# Patient Record
Sex: MALE | Race: WHITE | Employment: UNEMPLOYED | URBAN - METROPOLITAN AREA
[De-identification: names, ages, dates, MRNs, and addresses within clinical notes are randomized per-mention and may not be internally consistent; named-entity substitution may affect disease eponyms.]

---

## 2023-01-06 RX ORDER — MULTIVIT WITH MINERALS/LUTEIN
1000 TABLET ORAL DAILY
COMMUNITY

## 2023-01-06 RX ORDER — VALACYCLOVIR HYDROCHLORIDE 1 G/1
1000 TABLET, FILM COATED ORAL 2 TIMES DAILY
COMMUNITY

## 2023-01-06 RX ORDER — DORZOLAMIDE HYDROCHLORIDE AND TIMOLOL MALEATE 20; 5 MG/ML; MG/ML
1 SOLUTION/ DROPS OPHTHALMIC 2 TIMES DAILY
COMMUNITY

## 2023-01-06 RX ORDER — PREDNISOLONE ACETATE 10 MG/ML
1 SUSPENSION/ DROPS OPHTHALMIC 4 TIMES DAILY
COMMUNITY

## 2023-01-06 RX ORDER — M-VIT,TX,IRON,MINS/CALC/FOLIC 27MG-0.4MG
1 TABLET ORAL DAILY
COMMUNITY

## 2023-01-06 RX ORDER — BRIMONIDINE TARTRATE 2 MG/ML
1 SOLUTION/ DROPS OPHTHALMIC 3 TIMES DAILY
COMMUNITY

## 2023-01-06 NOTE — PROGRESS NOTES
[facility] time_____0940_______        Surgery time___1110_________    Take the following medications with a sip of water: Follow your MD/Surgeons pre-procedure instructions regarding your medications     Do not eat or drink anything after 12:00 midnight prior to your surgery. This includes water chewing gum, mints and ice chips. You may brush your teeth and gargle the morning of your surgery, but do not swallow the water     Please see your family doctor/pediatrician for a history and physical and/or concerning medications. H&P 1/9/23 Melissa Amaro    Bring any test results/reports from your physicians office. If you are under the care of a heart doctor or specialist doctor, please be aware that you may be asked to them for clearance    You may be asked to stop blood thinners such as Coumadin, Plavix, Fragmin, Lovenox, etc., or any anti-inflammatories such as:  Aspirin, Ibuprofen, Advil, Naproxen prior to your surgery. We also ask that you stop any OTC medications such as fish oil, vitamin E, glucosamine, garlic, Multivitamins, COQ 10, etc.    We ask that you do not smoke 24 hours prior to surgery  We ask that you do not  drink any alcoholic beverages 24 hours prior to surgery     You must make arrangements for a responsible adult to take you home after your surgery. For your safety you will not be allowed to leave alone or drive yourself home. Your surgery will be cancelled if you do not have a ride home. Also for your safety, it is strongly suggested that someone stay with you the first 24 hours after your surgery. A parent or legal guardian must accompany a child scheduled for surgery and plan to stay at the hospital until the child is discharged. Please do not bring other children with you. For your comfort, please wear simple loose fitting clothing to the hospital.  Please do not bring valuables.  Wear short sleeve button down shirt or loose shirt and bring eye drops or eye ointment. Do not wear any make-up or nail polish on your fingers or toes      For your safety, please do not wear any jewelry or body piercing's on the day of surgery. All jewelry must be removed. If you have dentures, they will be removed before going to operating room. For your convenience, we will provide you with a container. If you wear contact lenses or glasses, they will be removed, please bring a case for them. If you have a living will and a durable power of  for healthcare, please bring in a copy. As part of our patient safety program to minimize surgical site infections, we ask you to do the following:    Please notify your surgeon if you develop any illness between         now and the  day of your surgery. This includes a cough, cold, fever, sore throat, nausea,         or vomiting, and diarrhea, etc.   Please notify your surgeon if you experience dizziness, shortness         of breath or blurred vision between now and the time of your surgery. Do not shave your operative site 96 hours prior to surgery. For face and neck surgery, men may use an electric razor 48 hours   prior to surgery. You may shower the night before surgery or the morning of   your surgery with an antibacterial soap. You will need to bring a photo ID and insurance card    [facility] has an onsite pharmacy, would you like to utilize our pharmacy     If you will be staying overnight and use a C-pap machine, please bring   your C-pap to hospital     Our goal is to provide you with excellent care, therefore, visitors will be limited to two(2) in the room at a time so that we may focus on providing this care for you. Please contact pre-admission testing if you have any further questions.                  [facility] phone number:  [phone]  Please note these are generalized instructions for all surgical cases, you may be provided with more specific instructions according to your surgery. C-Difficile admission screening and protocol:       * Admitted with diarrhea? [] YES    [x]  NO     *Prior history of C-Diff. In last 3 months? [] YES    [x]  NO     *Antibiotic use in the past 6-8 weeks? []  NO    []  YES                 If yes, which ANTIBIOTIC AND REASON______     *Prior hospitalization or nursing home in the last month? []  YES    []  NO        SAFETY FIRST. .call before you fall

## 2023-01-09 ENCOUNTER — ANESTHESIA EVENT (OUTPATIENT)
Dept: SURGERY | Age: 48
End: 2023-01-09
Payer: COMMERCIAL

## 2023-01-09 NOTE — PROGRESS NOTES
[address] [facility]  [phone]        Pre-Op Phone Call:     Patient Name: Mariana Sanchez     Telephone Information:   Mobile [phone]     Home phone:  [phone]    Surgery Time:   11:10 AM     Arrival Time:  9:40am     Left extended Message:  Yes     Message left with:     Recent change in health status:  NA     Advised of transportation/ policy:  NA     NPO policy reviewed:  NA     Advised to take morning heart/blood pressure medications with sips of water morning of surgery? NA     Instructed to bring eye drops, photo identification, and insurance card day of surgery? NA     Advised to wear short sleeved button down shirt (no T-shirt underneath):  NA     Advised not to wear jewelry, hairpins, or pantyhose day of surgery? NA     Advised not to wear make-up and to wash face day of surgery? NA    Remarks:  Attempted to call and leave VM, however, pt's voicemail box is full.         Electronically signed by:  Anna Lopez RN at 1/9/2023 11:27 AM

## 2023-01-10 ENCOUNTER — PREP FOR PROCEDURE (OUTPATIENT)
Dept: OPHTHALMOLOGY | Age: 48
End: 2023-01-10

## 2023-01-10 ENCOUNTER — ANESTHESIA (OUTPATIENT)
Dept: SURGERY | Age: 48
End: 2023-01-10
Payer: COMMERCIAL

## 2023-01-10 ENCOUNTER — HOSPITAL ENCOUNTER (OUTPATIENT)
Age: 48
Setting detail: OUTPATIENT SURGERY
Discharge: HOME | End: 2023-01-10
Attending: OPHTHALMOLOGY | Admitting: OPHTHALMOLOGY
Payer: COMMERCIAL

## 2023-01-10 VITALS
TEMPERATURE: 97.7 F | SYSTOLIC BLOOD PRESSURE: 120 MMHG | HEIGHT: 70 IN | OXYGEN SATURATION: 99 % | WEIGHT: 180 LBS | BODY MASS INDEX: 25.77 KG/M2 | RESPIRATION RATE: 13 BRPM | HEART RATE: 50 BPM | DIASTOLIC BLOOD PRESSURE: 85 MMHG

## 2023-01-10 PROCEDURE — 3600000003 HC SURGERY LEVEL 3 BASE: Performed by: OPHTHALMOLOGY

## 2023-01-10 PROCEDURE — 3700000001 HC ADD 15 MINUTES (ANESTHESIA): Performed by: OPHTHALMOLOGY

## 2023-01-10 PROCEDURE — 3700000000 HC ANESTHESIA ATTENDED CARE: Performed by: OPHTHALMOLOGY

## 2023-01-10 PROCEDURE — 6370000000 HC RX 637 (ALT 250 FOR IP): Performed by: OPHTHALMOLOGY

## 2023-01-10 PROCEDURE — 6360000002 HC RX W HCPCS: Performed by: NURSE ANESTHETIST, CERTIFIED REGISTERED

## 2023-01-10 PROCEDURE — 2709999900 HC NON-CHARGEABLE SUPPLY: Performed by: OPHTHALMOLOGY

## 2023-01-10 PROCEDURE — 6360000002 HC RX W HCPCS: Performed by: OPHTHALMOLOGY

## 2023-01-10 PROCEDURE — 3600000013 HC SURGERY LEVEL 3 ADDTL 15MIN: Performed by: OPHTHALMOLOGY

## 2023-01-10 PROCEDURE — 2580000003 HC RX 258: Performed by: OPHTHALMOLOGY

## 2023-01-10 PROCEDURE — V2785 CORNEAL TISSUE PROCESSING: HCPCS | Performed by: OPHTHALMOLOGY

## 2023-01-10 PROCEDURE — 2500000003 HC RX 250 WO HCPCS: Performed by: OPHTHALMOLOGY

## 2023-01-10 PROCEDURE — C1783 OCULAR IMP, AQUEOUS DRAIN DE: HCPCS | Performed by: OPHTHALMOLOGY

## 2023-01-10 PROCEDURE — 7100000010 HC PHASE II RECOVERY - FIRST 15 MIN: Performed by: OPHTHALMOLOGY

## 2023-01-10 DEVICE — GLAUCOMA SHUNT
Type: IMPLANTABLE DEVICE | Site: EYE | Status: FUNCTIONAL
Brand: AHMED™ GLAUCOMA VALVE

## 2023-01-10 DEVICE — GRAFT HUM TISS W/O SCLER RIM: Type: IMPLANTABLE DEVICE | Site: EYE | Status: FUNCTIONAL

## 2023-01-10 RX ORDER — BALANCED SALT SOLUTION 6.4; .75; .48; .3; 3.9; 1.7 MG/ML; MG/ML; MG/ML; MG/ML; MG/ML; MG/ML
SOLUTION OPHTHALMIC
Status: COMPLETED | OUTPATIENT
Start: 2023-01-10 | End: 2023-01-10

## 2023-01-10 RX ORDER — SODIUM CHLORIDE 0.9 % (FLUSH) 0.9 %
5-40 SYRINGE (ML) INJECTION PRN
Status: DISCONTINUED | OUTPATIENT
Start: 2023-01-10 | End: 2023-01-10 | Stop reason: HOSPADM

## 2023-01-10 RX ORDER — NEOMYCIN SULFATE, POLYMYXIN B SULFATE, AND DEXAMETHASONE 3.5; 10000; 1 MG/G; [USP'U]/G; MG/G
OINTMENT OPHTHALMIC
Status: COMPLETED | OUTPATIENT
Start: 2023-01-10 | End: 2023-01-10

## 2023-01-10 RX ORDER — SODIUM CHLORIDE 0.9 % (FLUSH) 0.9 %
5-40 SYRINGE (ML) INJECTION EVERY 12 HOURS SCHEDULED
Status: DISCONTINUED | OUTPATIENT
Start: 2023-01-10 | End: 2023-01-10 | Stop reason: HOSPADM

## 2023-01-10 RX ORDER — SODIUM CHLORIDE 0.9 % (FLUSH) 0.9 %
5-40 SYRINGE (ML) INJECTION PRN
Status: CANCELLED | OUTPATIENT
Start: 2023-01-10

## 2023-01-10 RX ORDER — PROPARACAINE HYDROCHLORIDE 5 MG/ML
1 SOLUTION/ DROPS OPHTHALMIC ONCE
Status: COMPLETED | OUTPATIENT
Start: 2023-01-10 | End: 2023-01-10

## 2023-01-10 RX ORDER — MIDAZOLAM HYDROCHLORIDE 1 MG/ML
INJECTION INTRAMUSCULAR; INTRAVENOUS PRN
Status: DISCONTINUED | OUTPATIENT
Start: 2023-01-10 | End: 2023-01-10 | Stop reason: SDUPTHER

## 2023-01-10 RX ORDER — SODIUM CHLORIDE 0.9 % (FLUSH) 0.9 %
5-40 SYRINGE (ML) INJECTION EVERY 12 HOURS SCHEDULED
Status: CANCELLED | OUTPATIENT
Start: 2023-01-10

## 2023-01-10 RX ORDER — PROPOFOL 10 MG/ML
INJECTION, EMULSION INTRAVENOUS PRN
Status: DISCONTINUED | OUTPATIENT
Start: 2023-01-10 | End: 2023-01-10 | Stop reason: SDUPTHER

## 2023-01-10 RX ORDER — FENTANYL CITRATE 50 UG/ML
INJECTION, SOLUTION INTRAMUSCULAR; INTRAVENOUS PRN
Status: DISCONTINUED | OUTPATIENT
Start: 2023-01-10 | End: 2023-01-10 | Stop reason: SDUPTHER

## 2023-01-10 RX ORDER — CIPROFLOXACIN HYDROCHLORIDE 3.5 MG/ML
1 SOLUTION/ DROPS TOPICAL SEE ADMIN INSTRUCTIONS
Status: COMPLETED | OUTPATIENT
Start: 2023-01-10 | End: 2023-01-10

## 2023-01-10 RX ORDER — SODIUM CHLORIDE 9 MG/ML
INJECTION, SOLUTION INTRAVENOUS PRN
Status: DISCONTINUED | OUTPATIENT
Start: 2023-01-10 | End: 2023-01-10 | Stop reason: HOSPADM

## 2023-01-10 RX ORDER — DEXAMETHASONE SODIUM PHOSPHATE 10 MG/ML
INJECTION INTRAMUSCULAR; INTRAVENOUS
Status: COMPLETED | OUTPATIENT
Start: 2023-01-10 | End: 2023-01-10

## 2023-01-10 RX ORDER — SODIUM CHLORIDE 9 MG/ML
INJECTION, SOLUTION INTRAVENOUS PRN
Status: CANCELLED | OUTPATIENT
Start: 2023-01-10

## 2023-01-10 RX ORDER — CEFAZOLIN SODIUM 1 G/3ML
INJECTION, POWDER, FOR SOLUTION INTRAMUSCULAR; INTRAVENOUS
Status: COMPLETED | OUTPATIENT
Start: 2023-01-10 | End: 2023-01-10

## 2023-01-10 RX ORDER — CIPROFLOXACIN HYDROCHLORIDE 3.5 MG/ML
1 SOLUTION/ DROPS TOPICAL SEE ADMIN INSTRUCTIONS
Status: CANCELLED | OUTPATIENT
Start: 2023-01-10

## 2023-01-10 RX ADMIN — FENTANYL CITRATE 25 MCG: 50 INJECTION INTRAMUSCULAR; INTRAVENOUS at 11:42

## 2023-01-10 RX ADMIN — PROPOFOL 70 MG: 10 INJECTION, EMULSION INTRAVENOUS at 11:42

## 2023-01-10 RX ADMIN — PROPARACAINE HYDROCHLORIDE 1 DROP: 5 SOLUTION/ DROPS OPHTHALMIC at 10:10

## 2023-01-10 RX ADMIN — MIDAZOLAM 2 MG: 1 INJECTION INTRAMUSCULAR; INTRAVENOUS at 11:38

## 2023-01-10 RX ADMIN — SODIUM CHLORIDE: 9 INJECTION, SOLUTION INTRAVENOUS at 10:04

## 2023-01-10 RX ADMIN — CIPROFLOXACIN HYDROCHLORIDE 1 DROP: 3 SOLUTION/ DROPS OPHTHALMIC at 10:00

## 2023-01-10 RX ADMIN — CIPROFLOXACIN HYDROCHLORIDE 1 DROP: 3 SOLUTION/ DROPS OPHTHALMIC at 10:05

## 2023-01-10 RX ADMIN — FENTANYL CITRATE 25 MCG: 50 INJECTION INTRAMUSCULAR; INTRAVENOUS at 11:51

## 2023-01-10 ASSESSMENT — PAIN - FUNCTIONAL ASSESSMENT: PAIN_FUNCTIONAL_ASSESSMENT: 0-10

## 2023-01-10 ASSESSMENT — PAIN SCALES - GENERAL
PAINLEVEL_OUTOF10: 0
PAINLEVEL_OUTOF10: 0

## 2023-01-10 NOTE — OP NOTE
Operative Note        [place] [address] [address] [facility] [address] [place] [address]  [phone]      1/10/2023    Patient name: Cash Lorenzana  YOB: 1975  MRN: [id number]    Preoperative diagnosis:   1. 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 Ahmed Glaucoma Implant  right eye   2. Cornea Patch Graft right eye     Surgeon: Brenden Gutierrez. Maria Del Carmen Gutierrez M.D. Anesthesia: MAC with Regional  Complications:None  Estimated Blood Loss: < 3cc  Implants: None    Indications for procedure: The patient has a history of glaucoma which is currently uncontrolled. In order to achieve better control of the intraocular pressure, filtering surgery was advised to try and prevent further loss of vision. Following discussion of all risks, benefits, and alternatives, the patient agreed to have the procedure done. Informed consent was signed. Operative Procedure: The patient was taken to the holding area at the where the operative site was confirmed. A retrobulbar block was administered. The patient was brought to the operating room and placed in the supine position. The operative eye was prepped and draped in the usual sterile fashion for ophthalmic surgery using betadine and sterile disposable drapes. A temporal lid speculum was placed. Under the operating microscope, 7-0 Vicryl was placed through the superior clear cornea as a traction suture and clamped inferonasally for adequate exposure of the superotemporal quadrant. A limbal peritomy was carried out superotemporally with radial relaxing incisions at each extent. Posterior sub-Tenon's dissection was carried out bluntly and sharply. Hemostasis was achieved with cautery. A mitomycin soaked sponge was placed in the superotemporal quadrant for one minute. Copious BSS irrigation was administered following sponge removal.    An Ahmed implant was primed with balanced salt solution. It was then slipped into Subtenon's space in the superotemporal quadrant. The plate was secured to the sclera with two 8-0 Nylon sutures approximately 8-10 mm posterior to the limbus. The sutures were buried. The implant was noted to be secured well to the scleral wall. The hemostat was then removed and then tube was laid across the limbus and trimmed to length with an anterior bevel, and a proposed sclerotomy site was cauterized. A temporal clear cornea paracentesis was made. A 23-gauge needle was used to make a tunneling sclerotomy into the anterior chamber, the tube was placed, and was seen to be in good position. To prevent late erosion and exposure of the tube, a piece of donor corneal tissue that had been trimmed to an appropriate size was laid over the anterior external portion of the tube and the corners sutured to the patients sclera with simple interrupted 8-0 Vicryl sutures. The conjunctiva was reapproximated to the limbus and closed with 8-0 Vicryl. Healon was instilled into the anterior chamber through the paracentesis resulting in a deep chamber, a nice bleb with an intact watertight wound, and a good intraocular pressure by digital palpation. A subconjunctival injection of Ancef and Dexamethasone were made in the inferior conjunctiva. The corneal traction suture was cut and removed. The lid speculum and drapes were removed. Maxitrol ointment was instilled into the inferior fornix. A patch and shield were taped. The patient tolerated the procedure well and taken to the recovery room in good condition. I certify that I was present for and performed the entire operative procedure. Electronically signed by:  Eliezer Huston MD,1/10/2023,12:22 PM

## 2023-01-10 NOTE — ANESTHESIA PRE PROCEDURE
Department of Anesthesiology  Preprocedure Note       Name:  Peter Lindquist   Age:  50 y.o.  :  1975                                          MRN:  [id number]         Date:  1/10/2023      Surgeon: Shiv Treviño):  Yousif Mcclain MD    Procedure: Procedure(s):  TUBE SHUNT AND PATCH GRAFT - RIGHT EYE, RETROBULBAR LONG BLOCK    Medications prior to admission:   Prior to Admission medications    Medication Sig Start Date End Date Taking?  Authorizing Provider   brimonidine (ALPHAGAN) 0.2 % ophthalmic solution 1 drop 3 times daily   Yes Historical Provider, MD   dorzolamide-timolol (COSOPT) 22.3-6.8 MG/ML ophthalmic solution 1 drop 2 times daily   Yes Historical Provider, MD   prednisoLONE acetate (PRED FORTE) 1 % ophthalmic suspension 1 drop 4 times daily   Yes Historical Provider, MD   vitamin E 1000 units capsule Take 1,000 Units by mouth daily   Yes Historical Provider, MD   Multiple Vitamins-Minerals (THERAPEUTIC MULTIVITAMIN-MINERALS) tablet Take 1 tablet by mouth daily   Yes Historical Provider, MD   valACYclovir (VALTREX) 1 g tablet Take 1,000 mg by mouth 2 times daily   Yes Historical Provider, MD       Current medications:    Current Facility-Administered Medications   Medication Dose Route Frequency Provider Last Rate Last Admin    sodium chloride flush 0.9 % injection 5-40 mL  5-40 mL IntraVENous 2 times per day Sean Hodgson MD        sodium chloride flush 0.9 % injection 5-40 mL  5-40 mL IntraVENous PRN Sean Hodgson MD        0.9 % sodium chloride infusion   IntraVENous PRN Sean Hodgson MD        sodium chloride flush 0.9 % injection 5-40 mL  5-40 mL IntraVENous 2 times per day Yousif Mcclain MD        sodium chloride flush 0.9 % injection 5-40 mL  5-40 mL IntraVENous PRN Yousif Mcclain MD        0.9 % sodium chloride infusion   IntraVENous PRN Yousif Mcclain MD 25 mL/hr at 01/10/23 1015 NoRateChange at 01/10/23 1015       Allergies:  No Known Allergies    Problem List:    Patient Active Problem List Diagnosis Code    Inguinal hernia, left K40.90    Chest wall pain R07.89    Rash R21    Acute non-recurrent frontal sinusitis J01.10       Past Medical History:        Diagnosis Date    Glaucoma        Past Surgical History:        Procedure Laterality Date    HERNIA REPAIR      umbilical X 2    LASIK         Social History:    Social History     Tobacco Use    Smoking status: Never    Smokeless tobacco: Never   Substance Use Topics    Alcohol use: Yes     Comment: occasional use                                Counseling given: Not Answered      Vital Signs (Current):   Vitals:    01/06/23 1342 01/10/23 0957   BP:  127/85   Pulse:  52   Resp:  14   Temp:  97.2 °F (36.2 °C)   TempSrc:  Temporal   SpO2:  98%   Weight: 180 lb (81.6 kg) 180 lb (81.6 kg)   Height: 5' 9.5\" (1.765 m) 5' 9.5\" (1.765 m)                                              BP Readings from Last 3 Encounters:   01/10/23 127/85   12/02/16 118/70   12/30/15 116/68       NPO Status: Time of last liquid consumption: 2200                        Time of last solid consumption: 2200                        Date of last liquid consumption: 01/09/23                        Date of last solid food consumption: 01/09/23    BMI:   Wt Readings from Last 3 Encounters:   01/10/23 180 lb (81.6 kg)   12/02/16 182 lb 9.6 oz (82.8 kg)   12/30/15 185 lb (83.9 kg)     Body mass index is 26.2 kg/m². CBC: No results found for: WBC, RBC, HGB, HCT, MCV, RDW, PLT    CMP: No results found for: NA, K, CL, CO2, BUN, CREATININE, GFRAA, AGRATIO, LABGLOM, GLUCOSE, GLU, PROT, CALCIUM, BILITOT, ALKPHOS, AST, ALT    POC Tests: No results for input(s): POCGLU, POCNA, POCK, POCCL, POCBUN, POCHEMO, POCHCT in the last 72 hours. Coags: No results found for: PROTIME, INR, APTT    HCG (If Applicable): No results found for: PREGTESTUR, PREGSERUM, HCG, HCGQUANT     ABGs: No results found for: PHART, PO2ART, OPT3MUZ, AKD4XDH, BEART, T0ACCNEN     Type & Screen (If Applicable):   No results found for: Imani Tisha    Drug/Infectious Status (If Applicable):  No results found for: HIV, HEPCAB    COVID-19 Screening (If Applicable): No results found for: COVID19        Anesthesia Evaluation  Patient summary reviewed no history of anesthetic complications:   Airway: Mallampati: I  TM distance: >3 FB   Neck ROM: full  Mouth opening: > = 3 FB   Dental: normal exam         Pulmonary:Negative Pulmonary ROS and normal exam                               Cardiovascular:  Exercise tolerance: good (>4 METS),       (-) past MI, CAD and  GONZALEZ      Rhythm: regular  Rate: normal                    Neuro/Psych:   Negative Neuro/Psych ROS              GI/Hepatic/Renal:        (-) liver disease and no renal disease       Endo/Other:                      ROS comment: glaucoma Abdominal:             Vascular: negative vascular ROS. Other Findings:           Anesthesia Plan      MAC     ASA 1     (47 yo F with PMHx of glaucoma presents for tube shunt and patch graft with retrobulbar block. Discussed risks and benefits to sedation including nausea, vomiting, allergic reaction, headache, delayed cognitive recovery, stroke, heart attack, respiratory depression, and death which patient understood and agreed to proceed. The patient was given the opportunity to ask questions and all questions were answered to the patient's satisfaction.  )  Induction: intravenous. Anesthetic plan and risks discussed with patient. Plan discussed with CRNA. This pre-anesthesia assessment may be used as a history and physical.    DOS STAFF ADDENDUM:    Pt seen and examined, chart reviewed (including anesthesia, drug and allergy history). No interval changes to history and physical examination. Anesthetic plan, risks, benefits, alternatives, and personnel involved discussed with patient. Patient verbalized an understanding and agrees to proceed.       Laura Wright MD  January 10, 2023  10:18 AM

## 2023-01-10 NOTE — DISCHARGE INSTRUCTIONS
[facility] [address]   [phone]     Post-Operative Instructions for Day of Glaucoma Surgery with Dr. Caroline Saini    Patient Name: Camilo Prieto  : 1975  MRN: [id number]    Bring your eye drops with you to each appointment! If you have a patch or a shield on the eye, it is to remain on until you see your doctor on the day following your surgery. 1. A responsible adult, 18 years or older must be in attendance for 24 hours following discharge. 2. Rest quietly today. 3. Start with liquids first.  If no nausea, proceed with a light meal.  Drink at least 6 glasses of fluid after surgery. 4. Do not drive or operate heavy machinery for 24 hours or as directed. 5. No alcoholic beverages for 24 hours post op. 6. Do not make any legal or important decisions for the next 24 hours. 7. Check your temperature over the next five days and call your physician if greater than 101.0 F.    8. Notify your physician if you have rash, hives, difficulty breathing, or severe nausea or vomiting. 5. Contact your family physician for questions concerning your current home medications. 10. If pain intensifies or pain is unrelieved with pain medication as ordered, call your physician    ADDITIONAL INSTRUCTIONS    The post operative drops are VERY IMPORTANT. Use them as directed on your drop schedule. Always bring your post-op bag, drops and instruction sheet to all of your visits. Tape your protective shield over your eye at bedtime or naptime for one week to prevent accidentally rubbing or bumping your eye. If you have a patch or a shield on the eye, it is to remain on until you see your doctor on the day following your surgery. Keep the area around your eye clean with a clean face cloth moistened with warm water. Keep soap, lotion, shampoo, hairspray make-up, etc. away from your eye for 7 days. Do not wash your hair for 24 hours.   Do not rub your eye. This is the worse thing you can do while healing. No heavy lifting, bending, or straining for one week after surgery. You should have no severe pain after surgery. Your eye may feel irritated or scratchy. You may take Tylenol   (acetaminophen) for discomfort as needed as long as you do not have any liver problems. If pain becomes severe, call the doctors office immediately. Your eye may be red or bloodshot. This will gradually lessen as your eye heals. CALL THE OFFICE IMMEDIATELY IF YOU EXPERIENCE ANY OF THE FOLLOWING:  Sudden decrease in vision, severe pain, shower of floaters, flashes of light, veil-like curtain across your eye    Your next appointment is 1/11/23 @ 1:10PM at the [place] location. DR. Nena Burrows PHONE NUMBER:  [phone]    THERE IS AN EYE PHYSICIAN ON CALL 24 HOURS A DAY, SEVEN DAYS A WEEK--CALL FOR ANY QUESTIONS/PROBLEMS    Medications prior to admission have been reviewed. Please continue medications as ordered on Current Medication   Record when discharged unless otherwise noted. Anticoagulation therapy medications: May restart anticoagulants on the day of surgery as directed by your primary care physician. Please continue medications only as directed by your primary care and specialist physicians. The medications on your medication reconciliation form are simply the medications you reported you were taking at the time of this admission. We are simply asking you to resume the outpatient medications that you reported to us when you presented for your procedure. Please make sure you check with the physician(s) who prescribed your medications today when you leave the hospital to be sure you are taking the correct medications and dosages.   Patient and/or responsible party verbalizes understanding of above instructions

## 2023-01-10 NOTE — H&P
Date of Surgery Update:  Jessica Oh was seen, history and physical examination reviewed, and patient examined by me today. There have been no significant clinical changes since the completion of the previous history and physical.    Electronically signed by:  Gertrude Swan MD,1/10/2023,10:04 AM

## 2023-01-11 NOTE — ANESTHESIA POSTPROCEDURE EVALUATION
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 Philip Phase I: Philip Score: 10    Philip Phase II: Philip Score: 10      Anesthesia Post Evaluation    Patient location during evaluation: PACU  Patient participation: complete - patient participated  Level of consciousness: awake  Airway patency: patent  Nausea & Vomiting: no nausea and no vomiting  Cardiovascular status: blood pressure returned to baseline  Respiratory status: acceptable  Hydration status: stable  Comments: Vital signs stable  OK to discharge from Stage I post anesthesia care.   Care transferred from Anesthesiology department on discharge from perioperative area   Multimodal analgesia pain management approach

## 2023-06-05 RX ORDER — DORZOLAMIDE HYDROCHLORIDE AND TIMOLOL MALEATE 20; 5 MG/ML; MG/ML
1 SOLUTION/ DROPS OPHTHALMIC 2 TIMES DAILY
COMMUNITY

## 2023-06-13 RX ORDER — SODIUM CHLORIDE 9 MG/ML
INJECTION, SOLUTION INTRAVENOUS PRN
Status: CANCELLED | OUTPATIENT
Start: 2023-06-13

## 2023-06-13 RX ORDER — SODIUM CHLORIDE 0.9 % (FLUSH) 0.9 %
5-40 SYRINGE (ML) INJECTION EVERY 12 HOURS SCHEDULED
Status: CANCELLED | OUTPATIENT
Start: 2023-06-13

## 2023-06-13 RX ORDER — SODIUM CHLORIDE 0.9 % (FLUSH) 0.9 %
5-40 SYRINGE (ML) INJECTION PRN
Status: CANCELLED | OUTPATIENT
Start: 2023-06-13

## 2023-06-15 NOTE — PRE-PROCEDURE INSTRUCTIONS
[address] [facility]  [phone]        Pre-Op Phone Call:     Patient Name: Rubén Ellis     Telephone Information:   Mobile [phone]     Home phone:  [phone]    Surgery Time:    7:30 AM     Arrival Time:  0615     Left extended Message:  Yes     Message left with: patient     Recent change in health status:  No     Advised of transportation/ policy:  Yes     NPO policy reviewed: Yes     Advised to take morning heart/blood pressure medications with sips of water morning of surgery? Yes     Instructed to bring eye drops, photo identification, and insurance card day of surgery? Yes     Advised to wear short sleeved button down shirt (no T-shirt underneath):  Yes     Advised not to wear jewelry, hairpins, or pantyhose day of surgery? Yes     Advised not to wear make-up and to wash face day of surgery?   Yes    Remarks:        Electronically signed by:  Lauren Cosme RN at 6/15/2023 10:20 AM

## 2023-06-16 ENCOUNTER — PREP FOR PROCEDURE (OUTPATIENT)
Dept: OPHTHALMOLOGY | Age: 48
End: 2023-06-16

## 2023-06-16 ENCOUNTER — HOSPITAL ENCOUNTER (OUTPATIENT)
Age: 48
Setting detail: OUTPATIENT SURGERY
Discharge: HOME | End: 2023-06-16
Attending: OPHTHALMOLOGY | Admitting: OPHTHALMOLOGY
Payer: COMMERCIAL

## 2023-06-16 VITALS
OXYGEN SATURATION: 99 % | HEIGHT: 70 IN | DIASTOLIC BLOOD PRESSURE: 84 MMHG | WEIGHT: 177 LBS | HEART RATE: 51 BPM | SYSTOLIC BLOOD PRESSURE: 123 MMHG | RESPIRATION RATE: 13 BRPM | TEMPERATURE: 97.8 F | BODY MASS INDEX: 25.34 KG/M2

## 2023-06-16 PROBLEM — H25.11 NUCLEAR SCLEROTIC CATARACT OF RIGHT EYE: Status: ACTIVE | Noted: 2023-06-16

## 2023-06-16 PROBLEM — H25.11 NUCLEAR SCLEROTIC CATARACT OF RIGHT EYE: Status: RESOLVED | Noted: 2023-06-16 | Resolved: 2023-06-16

## 2023-06-16 PROCEDURE — 7100000010 HC PHASE II RECOVERY - FIRST 15 MIN: Performed by: OPHTHALMOLOGY

## 2023-06-16 PROCEDURE — 3700000000 HC ANESTHESIA ATTENDED CARE: Performed by: OPHTHALMOLOGY

## 2023-06-16 PROCEDURE — 2500000003 HC RX 250 WO HCPCS: Performed by: OPHTHALMOLOGY

## 2023-06-16 PROCEDURE — V2632 POST CHMBR INTRAOCULAR LENS: HCPCS | Performed by: OPHTHALMOLOGY

## 2023-06-16 PROCEDURE — 6370000000 HC RX 637 (ALT 250 FOR IP): Performed by: OPHTHALMOLOGY

## 2023-06-16 PROCEDURE — 3700000001 HC ADD 15 MINUTES (ANESTHESIA): Performed by: OPHTHALMOLOGY

## 2023-06-16 PROCEDURE — 3600000004 HC SURGERY LEVEL 4 BASE: Performed by: OPHTHALMOLOGY

## 2023-06-16 PROCEDURE — 2709999900 HC NON-CHARGEABLE SUPPLY: Performed by: OPHTHALMOLOGY

## 2023-06-16 PROCEDURE — 3600000014 HC SURGERY LEVEL 4 ADDTL 15MIN: Performed by: OPHTHALMOLOGY

## 2023-06-16 PROCEDURE — 6360000002 HC RX W HCPCS: Performed by: OPHTHALMOLOGY

## 2023-06-16 DEVICE — LENS CLAREON IOL 23.0D: Type: IMPLANTABLE DEVICE | Site: EYE | Status: FUNCTIONAL

## 2023-06-16 RX ORDER — TROPICAMIDE 10 MG/ML
1 SOLUTION/ DROPS OPHTHALMIC SEE ADMIN INSTRUCTIONS
Status: CANCELLED | OUTPATIENT
Start: 2023-06-16

## 2023-06-16 RX ORDER — TETRACAINE HYDROCHLORIDE 5 MG/ML
SOLUTION OPHTHALMIC
Status: COMPLETED | OUTPATIENT
Start: 2023-06-16 | End: 2023-06-16

## 2023-06-16 RX ORDER — SODIUM CHLORIDE 0.9 % (FLUSH) 0.9 %
5-40 SYRINGE (ML) INJECTION PRN
Status: CANCELLED | OUTPATIENT
Start: 2023-06-16

## 2023-06-16 RX ORDER — SODIUM CHLORIDE 0.9 % (FLUSH) 0.9 %
5-40 SYRINGE (ML) INJECTION EVERY 12 HOURS SCHEDULED
Status: CANCELLED | OUTPATIENT
Start: 2023-06-16

## 2023-06-16 RX ORDER — PHENYLEPHRINE HCL 2.5 %
1 DROPS OPHTHALMIC (EYE) SEE ADMIN INSTRUCTIONS
Status: CANCELLED | OUTPATIENT
Start: 2023-06-16

## 2023-06-16 RX ORDER — BRIMONIDINE TARTRATE 1.5 MG/ML
SOLUTION/ DROPS OPHTHALMIC
Status: COMPLETED | OUTPATIENT
Start: 2023-06-16 | End: 2023-06-16

## 2023-06-16 RX ORDER — LIDOCAINE HYDROCHLORIDE 10 MG/ML
INJECTION, SOLUTION EPIDURAL; INFILTRATION; INTRACAUDAL; PERINEURAL
Status: COMPLETED | OUTPATIENT
Start: 2023-06-16 | End: 2023-06-16

## 2023-06-16 RX ORDER — CIPROFLOXACIN HYDROCHLORIDE 3.5 MG/ML
1 SOLUTION/ DROPS TOPICAL SEE ADMIN INSTRUCTIONS
Status: COMPLETED | OUTPATIENT
Start: 2023-06-16 | End: 2023-06-16

## 2023-06-16 RX ORDER — SODIUM CHLORIDE 0.9 % (FLUSH) 0.9 %
5-40 SYRINGE (ML) INJECTION PRN
Status: DISCONTINUED | OUTPATIENT
Start: 2023-06-16 | End: 2023-06-16 | Stop reason: HOSPADM

## 2023-06-16 RX ORDER — PREDNISOLONE ACETATE 10 MG/ML
SUSPENSION/ DROPS OPHTHALMIC
Status: COMPLETED | OUTPATIENT
Start: 2023-06-16 | End: 2023-06-16

## 2023-06-16 RX ORDER — TROPICAMIDE 10 MG/ML
1 SOLUTION/ DROPS OPHTHALMIC SEE ADMIN INSTRUCTIONS
Status: COMPLETED | OUTPATIENT
Start: 2023-06-16 | End: 2023-06-16

## 2023-06-16 RX ORDER — CIPROFLOXACIN HYDROCHLORIDE 3.5 MG/ML
1 SOLUTION/ DROPS TOPICAL SEE ADMIN INSTRUCTIONS
Status: CANCELLED | OUTPATIENT
Start: 2023-06-16

## 2023-06-16 RX ORDER — SODIUM CHLORIDE 9 MG/ML
INJECTION, SOLUTION INTRAVENOUS PRN
Status: DISCONTINUED | OUTPATIENT
Start: 2023-06-16 | End: 2023-06-16 | Stop reason: HOSPADM

## 2023-06-16 RX ORDER — SODIUM CHLORIDE 0.9 % (FLUSH) 0.9 %
5-40 SYRINGE (ML) INJECTION EVERY 12 HOURS SCHEDULED
Status: DISCONTINUED | OUTPATIENT
Start: 2023-06-16 | End: 2023-06-16 | Stop reason: HOSPADM

## 2023-06-16 RX ORDER — KETOROLAC TROMETHAMINE 5 MG/ML
1 SOLUTION OPHTHALMIC SEE ADMIN INSTRUCTIONS
Status: COMPLETED | OUTPATIENT
Start: 2023-06-16 | End: 2023-06-16

## 2023-06-16 RX ORDER — BALANCED SALT SOLUTION 6.4; .75; .48; .3; 3.9; 1.7 MG/ML; MG/ML; MG/ML; MG/ML; MG/ML; MG/ML
SOLUTION OPHTHALMIC
Status: COMPLETED | OUTPATIENT
Start: 2023-06-16 | End: 2023-06-16

## 2023-06-16 RX ORDER — OFLOXACIN 3 MG/ML
SOLUTION/ DROPS OPHTHALMIC
Status: COMPLETED | OUTPATIENT
Start: 2023-06-16 | End: 2023-06-16

## 2023-06-16 RX ORDER — KETOROLAC TROMETHAMINE 5 MG/ML
1 SOLUTION OPHTHALMIC SEE ADMIN INSTRUCTIONS
Status: CANCELLED | OUTPATIENT
Start: 2023-06-16

## 2023-06-16 RX ORDER — SODIUM CHLORIDE 9 MG/ML
INJECTION, SOLUTION INTRAVENOUS PRN
Status: CANCELLED | OUTPATIENT
Start: 2023-06-16

## 2023-06-16 RX ORDER — PHENYLEPHRINE HCL 2.5 %
1 DROPS OPHTHALMIC (EYE) SEE ADMIN INSTRUCTIONS
Status: COMPLETED | OUTPATIENT
Start: 2023-06-16 | End: 2023-06-16

## 2023-06-16 RX ORDER — TIMOLOL MALEATE 5 MG/ML
SOLUTION/ DROPS OPHTHALMIC
Status: COMPLETED | OUTPATIENT
Start: 2023-06-16 | End: 2023-06-16

## 2023-06-16 RX ORDER — KETOROLAC TROMETHAMINE 5 MG/ML
SOLUTION OPHTHALMIC
Status: COMPLETED | OUTPATIENT
Start: 2023-06-16 | End: 2023-06-16

## 2023-06-16 RX ADMIN — PHENYLEPHRINE HYDROCHLORIDE 1 DROP: 25 SOLUTION/ DROPS OPHTHALMIC at 06:49

## 2023-06-16 RX ADMIN — CIPROFLOXACIN 1 DROP: 3 SOLUTION OPHTHALMIC at 07:01

## 2023-06-16 RX ADMIN — PHENYLEPHRINE HYDROCHLORIDE 1 DROP: 25 SOLUTION/ DROPS OPHTHALMIC at 07:01

## 2023-06-16 RX ADMIN — CIPROFLOXACIN 1 DROP: 3 SOLUTION OPHTHALMIC at 06:55

## 2023-06-16 RX ADMIN — TROPICAMIDE 1 DROP: 10 SOLUTION/ DROPS OPHTHALMIC at 07:01

## 2023-06-16 RX ADMIN — TROPICAMIDE 1 DROP: 10 SOLUTION/ DROPS OPHTHALMIC at 06:49

## 2023-06-16 RX ADMIN — CIPROFLOXACIN 1 DROP: 3 SOLUTION OPHTHALMIC at 06:49

## 2023-06-16 RX ADMIN — TROPICAMIDE 1 DROP: 10 SOLUTION/ DROPS OPHTHALMIC at 06:55

## 2023-06-16 RX ADMIN — PHENYLEPHRINE HYDROCHLORIDE 1 DROP: 25 SOLUTION/ DROPS OPHTHALMIC at 06:55

## 2023-06-16 RX ADMIN — KETOROLAC TROMETHAMINE 1 DROP: 0.5 SOLUTION OPHTHALMIC at 06:55

## 2023-06-16 RX ADMIN — KETOROLAC TROMETHAMINE 1 DROP: 0.5 SOLUTION OPHTHALMIC at 06:49

## 2023-06-16 RX ADMIN — KETOROLAC TROMETHAMINE 1 DROP: 0.5 SOLUTION OPHTHALMIC at 07:01

## 2023-06-16 ASSESSMENT — PAIN - FUNCTIONAL ASSESSMENT: PAIN_FUNCTIONAL_ASSESSMENT: 0-10

## 2023-06-16 ASSESSMENT — PAIN SCALES - GENERAL
PAINLEVEL_OUTOF10: 0
PAINLEVEL_OUTOF10: 0

## 2023-06-16 NOTE — DISCHARGE INSTRUCTIONS
[facility], [address] [facility]       Post-Operative Instructions for Day of Cataract Surgery    @ED@    Patient Name: Vanessa Marin  : 1975  MRN: [id number]    Use the Antibiotic Drop, Ofloxacin,  one drop in the Right Eye two  more times today then three times daily beginning tomorrow. .  Use the non-steroidal anti-inflammatory drop, Ketoralac  one drop in the Right Eye two  more times today then three times daily beginning tomorrow. Use prednisolone, Cosopt, and Alphagan:  one drop in the Right Eye two more times today then three times daily beginning tomorrow. You may watch TV, walk, and do routine chores. Avoid heavy exertion or straining. Wear shield over operative eye at bedtime for ONE week. You may take Tylenol for mild irritation or pain. If you have pain worse than what Tylenol can manage, call your physician  Your next appointment is tomorrow  at the [facility] location. Remember to bring your eye medications and eye drop instruction sheet to each appoinment. General Post-Operative Instructions for Cataract Surgery    COMFORT - Your eye may feel irritated (scratchy, stinging, or achy) this is normal.   DIET - You may resume your regular diet, no alcohol for 24 hours. ACTIVITY - Do not lift anything over 5 pounds today for the first week. No driving for 24 hours. EYE CARE - Use your eye drops as instructed. Wash your hands before using your eye drops. Do not bump, rub, or touch the operative eye. No water in or around your eyes, or showering for 24 hours. SHIELD - Wear the eye shield for 7 nights following surgery. VISION - You may experience off/on blurry vision today. Your vision will steadily improve over the next days. Call your surgeon if you experience a significant decrease in your vision. Be careful when walking, especially with steps. You may experience double vision until the anesthetic fully wears off.     Bring your

## 2023-06-16 NOTE — OP NOTE
and the globe was pressurized using balanced salt solution. Two 10-0 nylon sutures were placed in the wound and one 10-0 nylon suture was placed in the paracentesis tract. The paracentesis tract and the temporal wound were both checked and found to be tight and secure. The speculum was removed. The post-operative drops were instilled. The patient tolerated the procedure well and was taken to the same day surgery suite in stable condition. Post-operative instructions and follow-up care were arranged. Implants:  Implant Name Type Inv.  Item Serial No.  Lot No. LRB No. Used Action   [employer] IOL 23.0D - [id number]  [employer] IOL 23.0D [id number] [address] [employer]  Right 1 Implanted       Electronically signed by Juliano Sam MD on 6/16/2023 at 8:38 AM

## 2023-06-16 NOTE — H&P
Date of Surgery Update:  Lizett Grace was seen, history and physical examination reviewed, and patient examined by me today. There have been no significant clinical changes since the completion of the previous history and physical.    Electronically signed by:  Jasvir Cardoza MD,6/16/2023,7:14 AM

## (undated) DEVICE — SUTURE ETHLN SZ 8-0 L12IN NONABSORBABLE BLK L7MM TG175-8 1716G

## (undated) DEVICE — EYE PAK* 1040 PLASTIC OPHTHALMIC DRAPE INCISE POUCH: Brand: ALCON EYE-PAK

## (undated) DEVICE — LENS CONTACT FLX AIR OPTIX NT

## (undated) DEVICE — SOLUTION IRRIG BSS ST 500ML

## (undated) DEVICE — PROBE HMO ERSR 18GA STR BIPOL

## (undated) DEVICE — ALCON SURGICAL BLADE 64: Brand: ALCON

## (undated) DEVICE — SYRINGE MED 30ML STD CLR PLAS LUERLOCK TIP N CTRL DISP

## (undated) DEVICE — MARKER,SKIN,WI/RULER AND LABELS: Brand: MEDLINE

## (undated) DEVICE — DRAPE SURG LG DISCOVISC

## (undated) DEVICE — Z DISCONTINUED USE 2131664 WIPE INSTR W3XL3IN NONLINTING

## (undated) DEVICE — Device

## (undated) DEVICE — Device: Brand: MEDEX

## (undated) DEVICE — HOLDER RESTRAINT LIMB UNIV FOAM PR D RNG SINGLE STRP LF

## (undated) DEVICE — AGENT VISCOELASTIC 085ML NONPYROGENIC SOD HYALURONATE

## (undated) DEVICE — SYRINGE TB 1ML NDL 25GA L0.625IN PLAS SLIP TIP CONVENTIONAL

## (undated) DEVICE — SYRINGE MED 3ML CLR PLAS STD N CTRL SLIP TIP DISP

## (undated) DEVICE — SOLUTION IRRIGATION BAL SALT SOLUTION 15 ML STRL BSS

## (undated) DEVICE — MICROSURGICAL INSTRUMENT ANTERIOR CHAMBER CANNULA 30GA: Brand: ALCON

## (undated) DEVICE — GLOVE SURG SZ 65 L12IN FNGR THK87MIL WHT LTX FREE

## (undated) DEVICE — MICROSURGICAL INSTRUMENT "J" SHAPED CANNULA 27GA: Brand: ALCON

## (undated) DEVICE — COVER,MAYO STAND,STERILE: Brand: MEDLINE

## (undated) DEVICE — OPHTHALMIC KNIFE 15°: Brand: ALCON

## (undated) DEVICE — SUTURE COAT VCRL SZ 8-0 L8IN ABSRB VLT TG140-8 L6.5MM 3/8 J547G

## (undated) DEVICE — SOLUTION IRRIG 250ML STRL H2O PLAS POUR BTL USP

## (undated) DEVICE — PAD,EYE,1-5/8X2 5/8,STERILE,LF,1/PK: Brand: MEDLINE

## (undated) DEVICE — WIPE INSTR W73XL73CM VISITEC

## (undated) DEVICE — MICROSURGICAL INSTRUMENT VISCOAT CANNULA 27GA THREADED HUB: Brand: ALCON

## (undated) DEVICE — NEEDLE HYPO REG BVL 23 GAX1.25 IN POLYPR SS TURQ PRECISGLDE

## (undated) DEVICE — NEEDLE HYPO 30GA L0.5IN BGE POLYPR HUB S STL REG BVL STR

## (undated) DEVICE — Z DISCONTINUED BY MEDLINE USE 2570500 SHIELD EYE CORNEAL STRL

## (undated) DEVICE — CLEARCUT® SIDEPORT KNIFE DUAL BEVEL 1.0MM ANGLED: Brand: CLEARCUT®

## (undated) DEVICE — Z INACTIVE OBSOLETE PER MEDTRONIC SHIELD EYE CORNEAL L15MM OD5MM POLYPR CELOS

## (undated) DEVICE — SUTURE VCRL SZ 7-0 L18IN ABSRB VLT L6.5MM TG140-8 3/8 CIR J546G

## (undated) DEVICE — SYRINGE MED 3ML CLR PLAS STD N CTRL LUERLOCK TIP DISP

## (undated) DEVICE — SYRINGE MED 5ML STD CLR PLAS LUERLOCK TIP N CTRL DISP

## (undated) DEVICE — SUTURE - NEEDLE TYPE AU-5,BLACK MONOFILAMENT NYLON(BMN), SUTURE SIZE 10-0, SUTURE LENGTH 12, DOUBLE ARMED: Brand: A.C.S® (ALCON CLOSURE SYSTEM)

## (undated) DEVICE — SYRINGE, LUER LOCK, 5ML: Brand: MEDLINE

## (undated) DEVICE — STRIPS OPTHLMC BIOGLO SODIUM 1MG

## (undated) DEVICE — SURGICAL PROC PACK SHT WEST V4

## (undated) DEVICE — SOLUTION IRRIG 500ML STRL H2O NONPYROGENIC